# Patient Record
Sex: MALE | Race: WHITE | URBAN - METROPOLITAN AREA
[De-identification: names, ages, dates, MRNs, and addresses within clinical notes are randomized per-mention and may not be internally consistent; named-entity substitution may affect disease eponyms.]

---

## 2022-05-27 ENCOUNTER — OFFICE VISIT (OUTPATIENT)
Dept: INTERNAL MEDICINE CLINIC | Age: 27
End: 2022-05-27
Payer: OTHER GOVERNMENT

## 2022-05-27 VITALS
DIASTOLIC BLOOD PRESSURE: 70 MMHG | HEART RATE: 74 BPM | SYSTOLIC BLOOD PRESSURE: 120 MMHG | WEIGHT: 242.8 LBS | OXYGEN SATURATION: 98 % | TEMPERATURE: 97.1 F

## 2022-05-27 DIAGNOSIS — Z00.00 ENCOUNTER FOR MEDICAL EXAMINATION TO ESTABLISH CARE: Primary | ICD-10-CM

## 2022-05-27 PROCEDURE — 99385 PREV VISIT NEW AGE 18-39: CPT | Performed by: NURSE PRACTITIONER

## 2022-05-27 SDOH — ECONOMIC STABILITY: FOOD INSECURITY: WITHIN THE PAST 12 MONTHS, THE FOOD YOU BOUGHT JUST DIDN'T LAST AND YOU DIDN'T HAVE MONEY TO GET MORE.: NEVER TRUE

## 2022-05-27 SDOH — ECONOMIC STABILITY: FOOD INSECURITY: WITHIN THE PAST 12 MONTHS, YOU WORRIED THAT YOUR FOOD WOULD RUN OUT BEFORE YOU GOT MONEY TO BUY MORE.: NEVER TRUE

## 2022-05-27 ASSESSMENT — PATIENT HEALTH QUESTIONNAIRE - PHQ9
DEPRESSION UNABLE TO ASSESS: PT REFUSES
SUM OF ALL RESPONSES TO PHQ QUESTIONS 1-9: 0
SUM OF ALL RESPONSES TO PHQ QUESTIONS 1-9: 0
2. FEELING DOWN, DEPRESSED OR HOPELESS: 0
SUM OF ALL RESPONSES TO PHQ QUESTIONS 1-9: 0
1. LITTLE INTEREST OR PLEASURE IN DOING THINGS: 0
SUM OF ALL RESPONSES TO PHQ QUESTIONS 1-9: 0
SUM OF ALL RESPONSES TO PHQ9 QUESTIONS 1 & 2: 0

## 2022-05-27 ASSESSMENT — ENCOUNTER SYMPTOMS
CONSTIPATION: 0
SINUS PAIN: 0
NAUSEA: 0
CHEST TIGHTNESS: 0
VOMITING: 0
DIARRHEA: 0
COUGH: 0
SHORTNESS OF BREATH: 0
SORE THROAT: 0

## 2022-05-27 ASSESSMENT — SOCIAL DETERMINANTS OF HEALTH (SDOH): HOW HARD IS IT FOR YOU TO PAY FOR THE VERY BASICS LIKE FOOD, HOUSING, MEDICAL CARE, AND HEATING?: NOT VERY HARD

## 2022-05-27 NOTE — PROGRESS NOTES
[address]  [address] [facility]  [address]  Tel:[phone]    Navneet Shown is a 32 y.o. male who presents today for his medical conditions/complaints as noted below. Navneet Shown is c/o of Annual Exam      Chief Complaint   Patient presents with    Annual Exam       HPI:     Ms. Shani Neal presents to establish care. He states he is overall well. 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 [facility] related [facility] (vaccination, screenings). Has had blood work previously, however hasn't been told results. Denies acute concerns of note. Entire medical history, surgical history, family history, allergies, social history, and health maintenance items reviewed and updated as captured in the relevant section of patient's chart. Past Medical History:   Diagnosis Date    ADHD (attention deficit hyperactivity disorder)     as  a kid    Allergic rhinitis     seasonal     Hearing loss     Hypertension     sometimes         History reviewed. No pertinent surgical history. Family History   Problem Relation Age of Onset    High Blood Pressure Father     Arthritis Paternal Grandmother     Heart Attack Paternal Grandfather     High Blood Pressure Paternal Grandfather     Diabetes Maternal Aunt     Diabetes Maternal Uncle        Social History     Tobacco Use    Smoking status: Never Smoker    Smokeless tobacco: Current User     Types: Snuff   Substance Use Topics    Alcohol use: Yes     Alcohol/week: 5.0 - 6.0 standard drinks     Types: 5 - 6 Cans of beer per week        No current outpatient medications on file. No current facility-administered medications for this visit. No Known Allergies    Subjective:      Review of Systems   Constitutional: Negative for fever. HENT: Negative for sinus pain and sore throat.     Respiratory: Negative for cough, chest tightness and shortness of breath. Cardiovascular: Negative for chest pain and palpitations. Gastrointestinal: Negative for constipation, diarrhea, nausea and vomiting. Genitourinary: Negative for dysuria and urgency. Skin: Negative for rash. Neurological: Negative for dizziness and weakness. Objective:     Vitals:    05/27/22 1320   BP: 120/70   Site: Right Upper Arm   Position: Sitting   Cuff Size: Large Adult   Pulse: 74   Temp: 97.1 °F (36.2 °C)   TempSrc: Temporal   SpO2: 98%   Weight: 242 lb 12.8 oz (110.1 kg)       Physical Exam  Constitutional:       Appearance: Normal appearance. He is well-developed. HENT:      Head: Normocephalic. Right Ear: Hearing and external ear normal.      Left Ear: Hearing and external ear normal.      Nose: Nose normal.   Eyes:      General: Lids are normal. Lids are everted, no foreign bodies appreciated. Conjunctiva/sclera: Conjunctivae normal.      Pupils: Pupils are equal, round, and reactive to light. Neck:      Thyroid: No thyroid mass. Vascular: Normal carotid pulses. No carotid bruit or JVD. Cardiovascular:      Rate and Rhythm: Normal rate and regular rhythm. No extrasystoles are present. Chest Wall: PMI is not displaced. Pulses:           Radial pulses are 2+ on the right side and 2+ on the left side. Dorsalis pedis pulses are 2+ on the right side and 2+ on the left side. Heart sounds: Normal heart sounds, S1 normal and S2 normal. Heart sounds not distant. No murmur heard. No friction rub. No gallop. No S3 or S4 sounds. Pulmonary:      Effort: Pulmonary effort is normal. No respiratory distress. Breath sounds: Normal breath sounds. No decreased breath sounds, wheezing, rhonchi or rales. Abdominal:      General: Bowel sounds are normal. There is no distension. Palpations: Abdomen is soft. Tenderness: There is no abdominal tenderness. There is no rebound.    Musculoskeletal:         General: Normal range of motion. Cervical back: Full passive range of motion without pain, normal range of motion and neck supple. Skin:     General: Skin is warm and dry. Neurological:      Cranial Nerves: No cranial nerve deficit. Psychiatric:         Speech: Speech normal.         Behavior: Behavior normal.         Thought Content: Thought content normal.         Judgment: Judgment normal.         Assessment & Plan: The following diagnoses and conditions are stable with no further orders unless indicated:    1. Encounter for medical examination to establish care        David Lindo was seen today for annual exam.    Diagnoses and all orders for this visit:    Encounter for medical examination to establish care    Discussed healthy lifestyle habits at length (encouraged regular exercise, healthy diet, no smoking, adequate sleep, sunscreen, safety items (car/driving, illness prevention.)    · A preventive eye exam performed by an eye specialist is recommended every 1-2 years to screen for glaucoma; cataracts, macular degeneration, and other eye disorders. · A preventive dental visit is recommended every 6 months. · Try to get at least 150 minutes of exercise per week or 10,000 steps per day on a pedometer . · You need 4826-7702 mg of calcium and 8016-3601 IU of vitamin D per day. It is possible to meet your calcium requirement with diet alone, but a vitamin D supplement is usually necessary to meet this goal.  · When exposed to the sun, use a sunscreen that protects against both UVA and UVB radiation with an SPF of 30 or greater. Reapply every 2 to 3 hours or after sweating, drying off with a towel, or swimming. · Always wear a seat belt when traveling in a car. Always wear a helmet when riding a bicycle or motorcycle. [unfilled]      Patientshospitals call the office immediately with new or ongoing signs or symptoms or worsening, or proceed to the emergency room.   If you are on medications which could impair your senses, you are at risk of weakness, falls,dizziness, or drowsiness. You should be careful during activities which could place you at risk of harm, such as climbing, using stairs, operating machinery, or driving vehicles. If you feel you cannot safely do theseactivities, you should request others to help you, or avoid the activities altogether. If you are drowsy for any other reason, you should use the same precautions as listed above. Call if pattern of symptoms change or persists for an extended time.       Chrissy Ramos

## 2022-11-02 ENCOUNTER — OFFICE VISIT (OUTPATIENT)
Dept: INTERNAL MEDICINE CLINIC | Age: 27
End: 2022-11-02
Payer: OTHER GOVERNMENT

## 2022-11-02 VITALS
OXYGEN SATURATION: 99 % | BODY MASS INDEX: 32.47 KG/M2 | SYSTOLIC BLOOD PRESSURE: 122 MMHG | HEART RATE: 61 BPM | WEIGHT: 245 LBS | HEIGHT: 73 IN | TEMPERATURE: 97.3 F | DIASTOLIC BLOOD PRESSURE: 76 MMHG | RESPIRATION RATE: 12 BRPM

## 2022-11-02 DIAGNOSIS — M65.4 DE QUERVAIN'S TENOSYNOVITIS, BILATERAL: Primary | ICD-10-CM

## 2022-11-02 PROCEDURE — 99213 OFFICE O/P EST LOW 20 MIN: CPT | Performed by: NURSE PRACTITIONER

## 2022-11-02 RX ORDER — METHYLPREDNISOLONE 4 MG/1
TABLET ORAL
Qty: 1 KIT | Refills: 0 | Status: SHIPPED | OUTPATIENT
Start: 2022-11-02 | End: 2022-11-08

## 2022-11-02 ASSESSMENT — ENCOUNTER SYMPTOMS
SHORTNESS OF BREATH: 0
SORE THROAT: 0
CHEST TIGHTNESS: 0
DIARRHEA: 0
SINUS PAIN: 0
VOMITING: 0
NAUSEA: 0
CONSTIPATION: 0
COUGH: 0

## 2022-11-02 NOTE — PROGRESS NOTES
[address]  94 [facility]  [address]  Tel:[phone]    Fallon Gardner is a 32 y.o. male who presents today for his medical conditions/complaints as noted below. Fallon Gardner is c/o of Hand Pain (Both hands are hurting, but the right one is worse than the left. Been going on for about 3 months. Thumbs and hands lock up at times. Wants a referral to [facility] to a hand doctor. )      Chief Complaint   Patient presents with    Hand Pain     Both hands are hurting, but the right one is worse than the left. Been going on for about 3 months. Thumbs and hands lock up at times. Wants a referral to [facility] to a hand doctor. HPI:     Fallon Gardner is a 32 y.o. male who presents for evaluation of bilateral thumb/wrist pain. The pain began several months ago. The pain is located primarily around the thumb and radiates into the distal forearm. He describes the symptoms as aching, boring, and tingling. Symptoms improve with rest, heat, avoiding painful activities. The symptoms are worse with flexion, extension, rotation, movement, use of injured area, and pressure on injured area. The patient  does not have neck pain. The patient uses his wrists/hands quite often in his daily occupuation. Treatment to date has been ice, heat, NSAID's, carpal tunnel wraps, without significant relief. Past Medical History:   Diagnosis Date    ADHD (attention deficit hyperactivity disorder)     as  a kid    Allergic rhinitis     seasonal     Hearing loss     Hypertension     sometimes         No past surgical history on file.     Family History   Problem Relation Age of Onset    High Blood Pressure Father     Arthritis Paternal Grandmother     Heart Attack Paternal Grandfather     High Blood Pressure Paternal Grandfather     Diabetes Maternal Aunt     Diabetes Maternal Uncle        Social History     Tobacco Use    Smoking status: Never    Smokeless tobacco: Current     Types: Snuff   Substance Use Topics    Alcohol use: Yes     Alcohol/week: 5.0 - 6.0 standard drinks     Types: 5 - 6 Cans of beer per week        Current Outpatient Medications   Medication Sig Dispense Refill    methylPREDNISolone (MEDROL DOSEPACK) 4 MG tablet Take by mouth. 1 kit 0     No current facility-administered medications for this visit. No Known Allergies    Subjective:      Review of Systems   Constitutional:  Negative for fever. HENT:  Negative for sinus pain and sore throat. Respiratory:  Negative for cough, chest tightness and shortness of breath. Cardiovascular:  Negative for chest pain and palpitations. Gastrointestinal:  Negative for constipation, diarrhea, nausea and vomiting. Genitourinary:  Negative for dysuria and urgency. Musculoskeletal:  Positive for arthralgias (BIlateral thumb/wrist pain). Skin:  Negative for rash. Neurological:  Negative for dizziness and weakness. Objective:     Vitals:    11/02/22 0828   BP: 122/76   Site: Right Upper Arm   Position: Sitting   Cuff Size: Medium Adult   Pulse: 61   Resp: 12   Temp: 97.3 °F (36.3 °C)   TempSrc: Temporal   SpO2: 99%   Weight: 245 lb (111.1 kg)   Height: 6' 1\" (1.854 m)       Physical Exam    Assessment & Plan: The following diagnoses and conditions are stable with no further orders unless indicated:    1. De Quervain's tenosynovitis, bilateral        Dee Magallon was seen today for hand pain. Diagnoses and all orders for this visit:    De Quervain's tenosynovitis, bilateral  -     methylPREDNISolone (MEDROL DOSEPACK) 4 MG tablet; Take by mouth. Suspect tenosynovitis given pain with flexion. Trial of steroid to relieve acute inflammation. Provided stretches to relieve inflammation. Encouraged heat application to reduce tension. Consider ortho if symptoms persist.     Return if symptoms worsen or fail to improve.       Patientshould call the office immediately with new or ongoing signs or symptoms or worsening, or proceed to the emergency room. If you are on medications which could impair your senses, you are at risk of weakness, falls,dizziness, or drowsiness. You should be careful during activities which could place you at risk of harm, such as climbing, using stairs, operating machinery, or driving vehicles. If you feel you cannot safely do theseactivities, you should request others to help you, or avoid the activities altogether. If you are drowsy for any other reason, you should use the same precautions as listed above. Call if pattern of symptoms change or persists for an extended time.       June Claude

## 2023-01-13 ENCOUNTER — OFFICE VISIT (OUTPATIENT)
Dept: INTERNAL MEDICINE CLINIC | Age: 28
End: 2023-01-13
Payer: OTHER GOVERNMENT

## 2023-01-13 VITALS
DIASTOLIC BLOOD PRESSURE: 80 MMHG | OXYGEN SATURATION: 98 % | TEMPERATURE: 97.7 F | WEIGHT: 250 LBS | SYSTOLIC BLOOD PRESSURE: 122 MMHG | BODY MASS INDEX: 31.08 KG/M2 | HEIGHT: 75 IN | RESPIRATION RATE: 12 BRPM | HEART RATE: 83 BPM

## 2023-01-13 DIAGNOSIS — M25.531 BILATERAL WRIST PAIN: Primary | ICD-10-CM

## 2023-01-13 DIAGNOSIS — M25.532 BILATERAL WRIST PAIN: Primary | ICD-10-CM

## 2023-01-13 PROCEDURE — 99213 OFFICE O/P EST LOW 20 MIN: CPT | Performed by: NURSE PRACTITIONER

## 2023-01-13 ASSESSMENT — ANXIETY QUESTIONNAIRES
2. NOT BEING ABLE TO STOP OR CONTROL WORRYING: 0
3. WORRYING TOO MUCH ABOUT DIFFERENT THINGS: 0
4. TROUBLE RELAXING: 0
GAD7 TOTAL SCORE: 0
5. BEING SO RESTLESS THAT IT IS HARD TO SIT STILL: 0
7. FEELING AFRAID AS IF SOMETHING AWFUL MIGHT HAPPEN: 0
IF YOU CHECKED OFF ANY PROBLEMS ON THIS QUESTIONNAIRE, HOW DIFFICULT HAVE THESE PROBLEMS MADE IT FOR YOU TO DO YOUR WORK, TAKE CARE OF THINGS AT HOME, OR GET ALONG WITH OTHER PEOPLE: NOT DIFFICULT AT ALL
6. BECOMING EASILY ANNOYED OR IRRITABLE: 0
1. FEELING NERVOUS, ANXIOUS, OR ON EDGE: 0

## 2023-01-13 ASSESSMENT — PATIENT HEALTH QUESTIONNAIRE - PHQ9
4. FEELING TIRED OR HAVING LITTLE ENERGY: 0
SUM OF ALL RESPONSES TO PHQ QUESTIONS 1-9: 0
8. MOVING OR SPEAKING SO SLOWLY THAT OTHER PEOPLE COULD HAVE NOTICED. OR THE OPPOSITE, BEING SO FIGETY OR RESTLESS THAT YOU HAVE BEEN MOVING AROUND A LOT MORE THAN USUAL: 0
5. POOR APPETITE OR OVEREATING: 0
2. FEELING DOWN, DEPRESSED OR HOPELESS: 0
1. LITTLE INTEREST OR PLEASURE IN DOING THINGS: 0
SUM OF ALL RESPONSES TO PHQ QUESTIONS 1-9: 0
6. FEELING BAD ABOUT YOURSELF - OR THAT YOU ARE A FAILURE OR HAVE LET YOURSELF OR YOUR FAMILY DOWN: 0
10. IF YOU CHECKED OFF ANY PROBLEMS, HOW DIFFICULT HAVE THESE PROBLEMS MADE IT FOR YOU TO DO YOUR WORK, TAKE CARE OF THINGS AT HOME, OR GET ALONG WITH OTHER PEOPLE: 0
SUM OF ALL RESPONSES TO PHQ QUESTIONS 1-9: 0
9. THOUGHTS THAT YOU WOULD BE BETTER OFF DEAD, OR OF HURTING YOURSELF: 0
3. TROUBLE FALLING OR STAYING ASLEEP: 0
SUM OF ALL RESPONSES TO PHQ QUESTIONS 1-9: 0
SUM OF ALL RESPONSES TO PHQ9 QUESTIONS 1 & 2: 0
7. TROUBLE CONCENTRATING ON THINGS, SUCH AS READING THE NEWSPAPER OR WATCHING TELEVISION: 0

## 2023-01-13 ASSESSMENT — ENCOUNTER SYMPTOMS
DIARRHEA: 0
SORE THROAT: 0
CHEST TIGHTNESS: 0
CONSTIPATION: 0
SHORTNESS OF BREATH: 0
NAUSEA: 0
SINUS PAIN: 0
COUGH: 0
VOMITING: 0

## 2023-01-13 NOTE — PROGRESS NOTES
[address]  94 [facility]  [address] [facility], [place] [address]  Tel:[phone]    Apurva King is a 32 y.o. male who presents today for his medical conditions/complaints as noted below. Apurva King is c/o of Follow-up      Chief Complaint   Patient presents with    Follow-up       HPI:     Apurva King is a 32 y.o. male who presents for evaluation of continued and worsening bilateral hand/finger pain. Onset was gradual, starting about several months ago. He describes a near constant stiffness/pain with popping when he supinates/pronates his wrist. He now states generally any movement such as flexion and extension of the wrist also causes considerable pain. The pain is moderate, worsens with movement, and is relieved by rest. There is associated tingling in the hands. He states the pain is causing disruption of his sleep. Evaluation to date: none. Treatment to date: OTC analgesics, ice, avoidance of offending activity, wrist splint which helps pain while wearing, however returns when not, and oral steroid pack which did relieve some pain, however returned thereafter with repetitive insult. He states he feels re-injury is unavoidable due to his line of work which requires the use of his hands frequently (recently installed a fence and noticed severe pain for several days thereafter). He also carries heavy items frequently in which he feels the pulling of his wrists causing considerable pain. Patient's medications, allergies, past medical, surgical, social and family histories were reviewed and updated as appropriate. Past Medical History:   Diagnosis Date    ADHD (attention deficit hyperactivity disorder)     as  a kid    Allergic rhinitis     seasonal     Hearing loss     Hypertension     sometimes         History reviewed. No pertinent surgical history.     Family History   Problem Relation Age of Onset    High Blood Pressure Father     Arthritis Paternal Grandmother     Heart Attack Paternal Grandfather     High Blood Pressure Paternal Grandfather     Diabetes Maternal Aunt     Diabetes Maternal Uncle        Social History     Tobacco Use    Smoking status: Never    Smokeless tobacco: Current     Types: Snuff   Substance Use Topics    Alcohol use: Yes     Alcohol/week: 5.0 - 6.0 standard drinks     Types: 5 - 6 Cans of beer per week        No current outpatient medications on file. No current facility-administered medications for this visit. No Known Allergies    Subjective:      Review of Systems   Constitutional:  Negative for fever. HENT:  Negative for sinus pain and sore throat. Respiratory:  Negative for cough, chest tightness and shortness of breath. Cardiovascular:  Negative for chest pain and palpitations. Gastrointestinal:  Negative for constipation, diarrhea, nausea and vomiting. Genitourinary:  Negative for dysuria and urgency. Musculoskeletal:  Positive for arthralgias (Bilateral wrist pain, stiffness) and joint swelling. Skin:  Negative for rash. Neurological:  Negative for dizziness and weakness. Objective:     Vitals:    01/13/23 0850   BP: 122/80   Site: Right Upper Arm   Position: Sitting   Cuff Size: Medium Adult   Pulse: 83   Resp: 12   Temp: 97.7 °F (36.5 °C)   TempSrc: Temporal   SpO2: 98%   Weight: 250 lb (113.4 kg)   Height: 6' 3\" (1.905 m)       Physical Exam  Vitals and nursing note reviewed. Constitutional:       Appearance: Normal appearance. He is well-developed. HENT:      Head: Normocephalic and atraumatic. Right Ear: Hearing and external ear normal.      Left Ear: Hearing and external ear normal.      Nose: Nose normal.   Eyes:      General: Lids are normal.      Pupils: Pupils are equal, round, and reactive to light. Cardiovascular:      Rate and Rhythm: Normal rate. Pulses: Normal pulses.    Pulmonary:      Effort: Pulmonary effort is normal. No accessory muscle usage or respiratory distress. Abdominal:      General: Bowel sounds are normal.      Palpations: Abdomen is soft. Musculoskeletal:      Right wrist: Swelling, tenderness and crepitus present. Decreased range of motion. Left wrist: Tenderness present. Decreased range of motion. Cervical back: Normal range of motion. Comments: Unable to perform Phalen's test due to pain with minimal movement. Tinel's test causes pain in the area of percussion   Skin:     General: Skin is warm and dry. Neurological:      Mental Status: He is alert. Psychiatric:         Speech: Speech normal.       Assessment & Plan: The following diagnoses and conditions are stable with no further orders unless indicated:    1. Bilateral wrist pain        Katherin Sethi was seen today for follow-up. Diagnoses and all orders for this visit:    Bilateral wrist pain  -     Brina Skelton MD, Hand Surgery (Hand, Wrist, Upper Extremity), [facility]  Recommend consultation with ortho for eval. Symptoms suggestive of possible carpal tunnel, however alternatives cannot be excluded. Due to inhibiting nature of his pain will likely need more advanced/procedural care. Continue NSAIDs for pain relief, could consider carpal tunnel bracing. Ice the wrists after work to decrease inflammation. Return if symptoms worsen or fail to improve. Patientshould call the office immediately with new or ongoing signs or symptoms or worsening, or proceed to the emergency room. If you are on medications which could impair your senses, you are at risk of weakness, falls,dizziness, or drowsiness. You should be careful during activities which could place you at risk of harm, such as climbing, using stairs, operating machinery, or driving vehicles. If you feel you cannot safely do theseactivities, you should request others to help you, or avoid the activities altogether.  If you are drowsy for any other reason, you should use the same precautions as listed above. Call if pattern of symptoms change or persists for an extended time.       Lima Farfan

## 2023-02-15 ENCOUNTER — OFFICE VISIT (OUTPATIENT)
Dept: ORTHOPEDIC SURGERY | Age: 28
End: 2023-02-15

## 2023-02-15 VITALS — RESPIRATION RATE: 16 BRPM | BODY MASS INDEX: 32.08 KG/M2 | WEIGHT: 250 LBS | HEIGHT: 74 IN

## 2023-02-15 DIAGNOSIS — M65.4 DE QUERVAIN'S DISEASE (RADIAL STYLOID TENOSYNOVITIS): Primary | ICD-10-CM

## 2023-02-15 NOTE — PROGRESS NOTES
Mr. Aracelis Gastelum is a 32 y.o. right handed man  who is seen today in Hand Surgical Consultation at the request of CASTILLO Iniguez CNP. He presents today regarding bilateral wrist, right greater than left symptoms which have been present for approximately 6 months. A history of antecedent trauma or injury is Absent. He reports symptoms to include moderate pain along the  right  Radial and Dorsal wrist and distal forearm. Wrist symptoms are worse with certain twisting or gripping activities. Symptoms are Daily worse with use. He reports moderate pain with thumb extension or pinch. Symptoms are worsening over time. Previous treatment has included conservative measures. He does not claim relation of his symptoms to his required work activities. He has not undergone any form of testing. I have today reviewed with Aracelis Gastelum the clinically relevant, past medical history, medications, allergies,  family history, social history, and Review Of Systems & I have documented any details relevant to today's presenting complaints in my history above. Mr. Luwanna Loron Closser's self-reported past medical history, medications, allergies,  family history, social history, and Review Of Systems have been scanned into the chart under the \"Media\" tab. Physical Exam:  Mr. Luwanna Loron Closser's most recent vitals:  Vitals  Resp: 16  Height: 6' 2\" (188 cm)  Weight: 250 lb (113.4 kg)    He is well nourished, oriented to person, place & time. He demonstrates appropriate mood and affect as well as normal gait and station. Skin: Normal in appearance, Normal Color, and Free of Lesions Bilaterally   Digital range of motion is limited by pain on the Right, greater than Left. Pain accompanies the flexion and extension of the right Thumb. There is not triggering associated with active thumb motion.   There is a palpable mass overlying the site of maximal tenderness right radial wrist.  Wrist range of motion is limited by pain and is accompanied by moderate pain in dorsiflexion greater than palmar flexion. There is no evidence of gross joint instability bilaterally. Sensation is normal in the Median, Ulnar, and Radial Sensory distribution bilaterally  Vascular examination reveals normal, good capillary refill, and good color bilaterally   Swelling is moderate along the radial margin of the wrist on the Right, greater than Left  Muscular strength is clinically appropriate bilaterally. Examination of the right first dorsal extensor compartment of the wrist demonstrates moderate thickening and fullness. There is moderate tenderness to palpation over the extensor tendons. There is moderate pain with resisted thumb extension, and Finklestein's maneuver is positive right side. Wrist range of motion is accompanied by moderate pain in dorsiflexion greater than palmar flexion. Impression:  Mr. Aung Perez has developed DeQuervain's Tendonitis, which is currently exacerbated, and presents requesting further treatment. Plan:  I have had a thorough discussion with Mr. Aung Perez regarding the treatment options available for his initially presenting right  DeQuervain's Tenosynovitis, which is causing him significant symptoms and difficulty. I have outlined for Mr. Aung Perez the risk, benefits and consequences of the various treatment modalities, including a reasonable expectation for the long term success of each. We have discussed the likelihood that further, more aggressive treatment may be required for his current presenting condition. Based upon our current discussion and a reasonable understating of the options available to him, Mr. Aung Perez has selected to proceed with a conservative plan of treatment consisting of: the use of protective splints, activity modification, and the judicious use of over-the-counter anti-inflammatory medications if allowed by his primary care physician.   An appropriately sized Removable forearm based Thumb-Spica orthosis was provided. Instructions were given regarding splint use and wear as well as suggestions for use of the other modalities were discussed. I have clearly explained to him that the above outlined treatment plan should not be expected to 'cure' his  DeQuervryder's Tenosynovitis, but we are rather treating the symptoms with which he presents. He has understood that in order to achieve more durable relief of his symptoms and to prevent future worsening or further damage, that definitive surgical treatment would be required. Mr. Dat Poon  voiced an appropriate understanding of our discussion, the options available to him, and of the expectations of his selected  treatment. I have also discussed with Mr. Dat Poon  the other treatment options available to him  for this condition. We have today selected to proceed with conservative management. He and I have agreed that if our current course of conservative treatment does not prove to be effective over the short term future, that he will schedule a follow-up appointment to discuss and select an alternate course of therapy including possibly injection or surgical treatment. Procedures    Jenn Vazquez Titan Wrist and Thumb Brace     Patient was prescribed a Jenn Vazquez Titan Wrist and Thumb Brace. The right wrist and thumb will require stabilization / immobilization from this semi-rigid / rigid orthosis to improve their function. The orthosis will assist in protecting the affected area, provide functional support and facilitate healing. The patient was educated and fit by a healthcare professional with expert knowledge and specialization in brace application while under the direct supervision of the treating physician. Verbal and written instructions for the use of and application of this item were provided.    They were instructed to contact the office immediately should the brace result in increased pain, decreased sensation, increased swelling or worsening of the condition. Mr. Idris Andrea has been given a full verbal list of instructions and precautions related to his present condition. I have asked him to followup with me in the office at the prescribed time. He is also specifically requested to call or return to the office sooner if his symptoms change or worsen prior to the next scheduled appointment.

## 2023-03-13 ENCOUNTER — TELEPHONE (OUTPATIENT)
Dept: ORTHOPEDIC SURGERY | Age: 28
End: 2023-03-13

## 2023-03-13 NOTE — TELEPHONE ENCOUNTER
General Question     Subject: PATIENT REQUESTING A CORTIZONE INJ. BRACE NOT HELPING.  Patient: Closser, Joshua  Contact Number: [phone]

## 2023-03-29 ENCOUNTER — OFFICE VISIT (OUTPATIENT)
Dept: ORTHOPEDIC SURGERY | Age: 28
End: 2023-03-29

## 2023-03-29 VITALS — BODY MASS INDEX: 32.08 KG/M2 | HEIGHT: 74 IN | WEIGHT: 250 LBS | RESPIRATION RATE: 16 BRPM

## 2023-03-29 DIAGNOSIS — M65.4 DE QUERVAIN'S DISEASE (RADIAL STYLOID TENOSYNOVITIS): Primary | ICD-10-CM

## 2023-03-29 RX ORDER — TRIAMCINOLONE ACETONIDE 40 MG/ML
40 INJECTION, SUSPENSION INTRA-ARTICULAR; INTRAMUSCULAR ONCE
Status: COMPLETED | OUTPATIENT
Start: 2023-03-29 | End: 2023-03-29

## 2023-03-29 RX ADMIN — TRIAMCINOLONE ACETONIDE 40 MG: 40 INJECTION, SUSPENSION INTRA-ARTICULAR; INTRAMUSCULAR at 16:48

## 2023-03-29 NOTE — Clinical Note
Dear  Samir Starkey, APRN - CNP,  Thank you very much for your referral or Mr. Elida Langley to me for evaluation and treatment of his Hand & Wrist condition. I appreciate your confidence in me and thank you for allowing me the opportunity to care for your patients. If I can be of any further assistance to you on this or any other patient, please do not hesitate to contact me. Sincerely,  Kaushal Kraus.  Romario Mclaughlin MD

## 2023-03-30 NOTE — PATIENT INSTRUCTIONS
Information & Instructions   After Finger, Hand, Wrist, or Elbow Injection    Jordan King MD    You have received an injection of local anesthetic (Bupivicaine without Epinephrine) for comfort & a steroid (Kenalog) for its strong anti-inflammatory effects. In order to give the medication a chance to reduce your inflammation and discomfort, it is recommended that you take it easy for a day or so. You may use your hand and arm as you feel comfortable, but you should avoid highly strenuous activity and heavy use for several days. Relief from the injection will often not begin for several days, and you may not feel full relief for up to one month. It is not uncommon to experience some local discomfort or pain at or around the injection site for a few days. To relieve these symptoms you may do the following if you feel necessary:       Apply ice to the affected area 20 minutes on and 20 minutes off. Do not apply ice directly to the skin. Use a thin layer (T-shirt, pillowcase, towel, etc.) to protect the skin. - If allowed by your other medical physicians, you may take -     2 Tylenol extra strength tablets every 4-6 hours       1-2 Aleve tablets twice a day     2-3 Advil tablets two to three times a day    If you are diabetic, the steroid medication may increase your blood sugar, so you are advised to monitor your sugar more closely so you can adjust it accordingly for a few days following your injection. If you need assistance with the control of you blood sugar, please contact you primary care physician for further advice. I will request that you please call the office one month after your injection at [phone] if you have not experienced relief of your symptoms (unless I have instructed you otherwise). If your injection has given you good relief of you symptoms as expected, then you only need to call the office if your symptoms return.

## 2023-03-30 NOTE — PROGRESS NOTES
Administrations This Visit       triamcinolone acetonide (KENALOG-40) injection 40 mg       Admin Date  03/29/2023  16:48 Action  Given Dose  40 mg Route  Other Site  Wrist Right Administered By  Ash Zelaya MA    Ordering Provider: Mono Hassan MD    NDC: 1056-0006-95    Lot#: [id number]    : [employer] [facility] (PRIMARY CARE)    Patient Supplied?: No      Admin Date  03/29/2023  16:48 Action  Given Dose  40 mg Route  Other Site  Wrist Left Administered By  Ash Zelaya MA    Ordering Provider: Mono Hassan MD    NDC: 3939-9107-46    Lot#: [id number]    : [facility] (PRIMARY CARE)    Patient Supplied?: No
capillary refill bilaterally  Swelling is mild along the First Extensor Compartment tendon sheath on the Right, greater than Left  Muscular strength is clinically appropriate bilaterally. Examination of the bilateral first dorsal extensor compartment of the wrist demonstrates moderate thickening and fullness. There is moderate tenderness to palpation over the extensor tendons. There is mild pain with resisted thumb extension, and Finklestein's maneuver is positive Right greater than Left . Examination of the bilateral first Carpo-Metacarpal Joint of the wrist demonstrates little radial subluxation of the Thumb Metacarpal base upon the Trapezium. There is mild pain with palpation at the [facility] joint line; pain is minimally worsened with Crank & Grind Maneuvers. Impression:  Mr. Ely Wells is showing evidence of persistent DeQuarvain's Tendonitis after previous treatment. He requests additional treatment at this time. Plan:  I have had a thorough discussion with Mr. Ely Wells regarding the treatment options available for his continued Bilateral  DeQuervain's Tenosynovitis, which is causing him functional limitations. I have outlined for Mr. Ely Wells the the various treatment modalities available to him, including the options and utility of the  judicious use of over-the-counter anti-inflammatory medications (if allowed by his primary care physician), the temporary relief afforded by injection of corticosteroids, and the more definitive option of surgical treatment for this problem if he feels that the duration or severity of his symptoms merits surgical intervention. I have explained  the reasonable expectations for the long term success of each option and the likelihood that further more aggressive treatment could be required for his current presenting condition. .  Based upon our current discussion and a reasonable understating of the options available to him, Mr. Ely Wells has

## 2023-05-16 ENCOUNTER — TELEPHONE (OUTPATIENT)
Dept: ORTHOPEDIC SURGERY | Age: 28
End: 2023-05-16

## 2023-05-16 NOTE — TELEPHONE ENCOUNTER
General Question     Subject: LETTER OF ORTHO CLEARANCE FOR      Patient and /or Facility Request: [facility]  Contact Number: [phone]     Pt IS IN THE  AND THEY ARE REQUESTING A LETTER OF CLEARANCE FOR THE Pt. ORTHO CLEARANCE LETTER NEEDS TO SAY HOW THE PATIENT IS DOING CURRENTLY,  THE DIAGNOSIS, AND THAT THE Pt IS OK FOR ONE YEAR. IF YOU HAVE FURTHER QUESTIONS, FEEL FREE TO CONTACT THE Pt.  IF HE NEEDS TO BE SEEN, HE IS WILLING, HOWEVER, HE WILL NEED TO PAY FOR THE APPT OUT OF POCKET.

## 2023-05-17 NOTE — TELEPHONE ENCOUNTER
Spoke with Dr. Lisa Moore we can say that the patient has no restrictions form us. Note was fax to number provided by patient.

## 2024-09-23 ENCOUNTER — TELEMEDICINE (OUTPATIENT)
Dept: INTERNAL MEDICINE CLINIC | Age: 29
End: 2024-09-23

## 2024-09-23 DIAGNOSIS — J01.00 ACUTE NON-RECURRENT MAXILLARY SINUSITIS: Primary | ICD-10-CM

## 2024-09-23 PROCEDURE — 99213 OFFICE O/P EST LOW 20 MIN: CPT | Performed by: NURSE PRACTITIONER

## 2024-09-23 SDOH — ECONOMIC STABILITY: FOOD INSECURITY: WITHIN THE PAST 12 MONTHS, YOU WORRIED THAT YOUR FOOD WOULD RUN OUT BEFORE YOU GOT MONEY TO BUY MORE.: NEVER TRUE

## 2024-09-23 SDOH — ECONOMIC STABILITY: INCOME INSECURITY: HOW HARD IS IT FOR YOU TO PAY FOR THE VERY BASICS LIKE FOOD, HOUSING, MEDICAL CARE, AND HEATING?: NOT VERY HARD

## 2024-09-23 SDOH — ECONOMIC STABILITY: FOOD INSECURITY: WITHIN THE PAST 12 MONTHS, THE FOOD YOU BOUGHT JUST DIDN'T LAST AND YOU DIDN'T HAVE MONEY TO GET MORE.: NEVER TRUE

## 2024-09-23 ASSESSMENT — PATIENT HEALTH QUESTIONNAIRE - PHQ9
SUM OF ALL RESPONSES TO PHQ QUESTIONS 1-9: 0
SUM OF ALL RESPONSES TO PHQ QUESTIONS 1-9: 0
1. LITTLE INTEREST OR PLEASURE IN DOING THINGS: NOT AT ALL
SUM OF ALL RESPONSES TO PHQ QUESTIONS 1-9: 0
SUM OF ALL RESPONSES TO PHQ QUESTIONS 1-9: 0
SUM OF ALL RESPONSES TO PHQ9 QUESTIONS 1 & 2: 0
2. FEELING DOWN, DEPRESSED OR HOPELESS: NOT AT ALL

## 2024-09-23 ASSESSMENT — ENCOUNTER SYMPTOMS
SORE THROAT: 0
SINUS PAIN: 1
NAUSEA: 0
CHEST TIGHTNESS: 0
DIARRHEA: 0
COUGH: 1
CONSTIPATION: 0
SINUS COMPLAINT: 1
SINUS PRESSURE: 1
SWOLLEN GLANDS: 0
SHORTNESS OF BREATH: 0
VOMITING: 0